# Patient Record
Sex: FEMALE | Race: WHITE | NOT HISPANIC OR LATINO | Employment: PART TIME | URBAN - METROPOLITAN AREA
[De-identification: names, ages, dates, MRNs, and addresses within clinical notes are randomized per-mention and may not be internally consistent; named-entity substitution may affect disease eponyms.]

---

## 2017-01-30 ENCOUNTER — GENERIC CONVERSION - ENCOUNTER (OUTPATIENT)
Dept: OTHER | Facility: OTHER | Age: 47
End: 2017-01-30

## 2020-10-05 ENCOUNTER — TELEMEDICINE (OUTPATIENT)
Dept: GASTROENTEROLOGY | Facility: CLINIC | Age: 50
End: 2020-10-05

## 2020-10-05 VITALS — BODY MASS INDEX: 20.38 KG/M2 | HEIGHT: 63 IN | WEIGHT: 115 LBS

## 2020-10-05 DIAGNOSIS — Z12.11 SCREENING FOR COLON CANCER: Primary | ICD-10-CM

## 2020-10-05 RX ORDER — LEVOTHYROXINE SODIUM 88 UG/1
1 TABLET ORAL DAILY
COMMUNITY
Start: 2012-11-01

## 2020-10-05 RX ORDER — ESTRADIOL 0.5 MG/1
0.5 TABLET ORAL DAILY
COMMUNITY

## 2020-10-05 RX ORDER — SODIUM, POTASSIUM,MAG SULFATES 17.5-3.13G
SOLUTION, RECONSTITUTED, ORAL ORAL
Qty: 2 BOTTLE | Refills: 0 | Status: SHIPPED | OUTPATIENT
Start: 2020-10-05 | End: 2020-10-12 | Stop reason: HOSPADM

## 2020-10-12 ENCOUNTER — ANESTHESIA (OUTPATIENT)
Dept: GASTROENTEROLOGY | Facility: AMBULATORY SURGERY CENTER | Age: 50
End: 2020-10-12

## 2020-10-12 ENCOUNTER — HOSPITAL ENCOUNTER (OUTPATIENT)
Dept: GASTROENTEROLOGY | Facility: AMBULATORY SURGERY CENTER | Age: 50
Discharge: HOME | End: 2020-10-12
Payer: COMMERCIAL

## 2020-10-12 ENCOUNTER — ANESTHESIA EVENT (OUTPATIENT)
Dept: GASTROENTEROLOGY | Facility: AMBULATORY SURGERY CENTER | Age: 50
End: 2020-10-12

## 2020-10-12 VITALS
TEMPERATURE: 97.5 F | DIASTOLIC BLOOD PRESSURE: 75 MMHG | HEART RATE: 59 BPM | OXYGEN SATURATION: 100 % | RESPIRATION RATE: 15 BRPM | SYSTOLIC BLOOD PRESSURE: 118 MMHG

## 2020-10-12 VITALS — HEART RATE: 69 BPM

## 2020-10-12 DIAGNOSIS — Z12.11 SCREENING FOR COLON CANCER: ICD-10-CM

## 2020-10-12 PROBLEM — E03.9 HYPOTHYROIDISM: Status: ACTIVE | Noted: 2020-10-12

## 2020-10-12 PROCEDURE — 88305 TISSUE EXAM BY PATHOLOGIST: CPT | Performed by: PATHOLOGY

## 2020-10-12 PROCEDURE — 45385 COLONOSCOPY W/LESION REMOVAL: CPT | Performed by: INTERNAL MEDICINE

## 2020-10-12 RX ORDER — SODIUM CHLORIDE 9 MG/ML
50 INJECTION, SOLUTION INTRAVENOUS CONTINUOUS
Status: DISCONTINUED | OUTPATIENT
Start: 2020-10-12 | End: 2020-10-16 | Stop reason: HOSPADM

## 2020-10-12 RX ORDER — PROPOFOL 10 MG/ML
INJECTION, EMULSION INTRAVENOUS AS NEEDED
Status: DISCONTINUED | OUTPATIENT
Start: 2020-10-12 | End: 2020-10-12

## 2020-10-12 RX ADMIN — SODIUM CHLORIDE 50 ML/HR: 9 INJECTION, SOLUTION INTRAVENOUS at 09:29

## 2020-10-12 RX ADMIN — PROPOFOL 50 MG: 10 INJECTION, EMULSION INTRAVENOUS at 09:54

## 2020-10-12 RX ADMIN — PROPOFOL 100 MG: 10 INJECTION, EMULSION INTRAVENOUS at 09:44

## 2020-10-12 RX ADMIN — PROPOFOL 100 MG: 10 INJECTION, EMULSION INTRAVENOUS at 09:39

## 2020-10-12 RX ADMIN — PROPOFOL 50 MG: 10 INJECTION, EMULSION INTRAVENOUS at 09:57

## 2022-10-19 ENCOUNTER — OFFICE VISIT (OUTPATIENT)
Dept: ENDOCRINOLOGY | Facility: HOSPITAL | Age: 52
End: 2022-10-19
Payer: COMMERCIAL

## 2022-10-19 VITALS
SYSTOLIC BLOOD PRESSURE: 122 MMHG | HEART RATE: 70 BPM | WEIGHT: 126 LBS | DIASTOLIC BLOOD PRESSURE: 70 MMHG | HEIGHT: 63 IN | BODY MASS INDEX: 22.32 KG/M2

## 2022-10-19 DIAGNOSIS — M81.6 LOCALIZED OSTEOPOROSIS WITHOUT CURRENT PATHOLOGICAL FRACTURE: Primary | ICD-10-CM

## 2022-10-19 DIAGNOSIS — E28.39 PREMATURE OVARIAN FAILURE: ICD-10-CM

## 2022-10-19 DIAGNOSIS — M85.852 OSTEOPENIA OF LEFT HIP: ICD-10-CM

## 2022-10-19 DIAGNOSIS — M85.88 OSTEOPENIA OF SPINE: ICD-10-CM

## 2022-10-19 DIAGNOSIS — E03.9 ACQUIRED HYPOTHYROIDISM: ICD-10-CM

## 2022-10-19 PROCEDURE — 99204 OFFICE O/P NEW MOD 45 MIN: CPT | Performed by: INTERNAL MEDICINE

## 2022-10-19 RX ORDER — LEVOTHYROXINE SODIUM 0.07 MG/1
75 TABLET ORAL
COMMUNITY
Start: 2021-07-01

## 2022-10-19 RX ORDER — MELATONIN
1000 DAILY
COMMUNITY
End: 2022-10-19 | Stop reason: ALTCHOICE

## 2022-10-19 RX ORDER — IMIQUIMOD 12.5 MG/.25G
CREAM TOPICAL
COMMUNITY
Start: 2022-10-12

## 2022-10-19 RX ORDER — IBUPROFEN 200 MG
1250 CAPSULE ORAL DAILY
COMMUNITY
End: 2022-10-19 | Stop reason: ALTCHOICE

## 2022-10-19 RX ORDER — ESTRADIOL 0.1 MG/G
CREAM VAGINAL
COMMUNITY
Start: 2022-10-01

## 2022-10-19 NOTE — PATIENT INSTRUCTIONS
Let's do blood work and a urine test     Continue the same calcium for now but move to 3-4 hours away from the thyroid medicine  Continue the exercising  The diet is good  We could treat with first line treatment for osteoporosis, ibandronate(boniva) once a month or alendronate(fosamax) once a week  Follow up to be determined

## 2022-10-19 NOTE — PROGRESS NOTES
10/19/2022    Assessment/Plan      Diagnoses and all orders for this visit:    Localized osteoporosis without current pathological fracture  -     Comprehensive metabolic panel Lab Collect  -     Phosphorus Lab Collect  -     Magnesium Lab Collect  -     PTH, intact Lab Collect Lab Collect  -     Protein electrophoresis, serum Lab Collect  -     Vitamin D 25 hydroxy Lab Collect  -     TSH, 3rd generation Lab Collect  -     T4, free Lab Collect  -     Thyroid Peroxidase and Thyroglobulin Antibodies  -     N-Telopeptide Cross-links (NTx), Urine (Serial Monitor)    Acquired hypothyroidism  -     Comprehensive metabolic panel Lab Collect  -     Phosphorus Lab Collect  -     Magnesium Lab Collect  -     PTH, intact Lab Collect Lab Collect  -     Protein electrophoresis, serum Lab Collect  -     Vitamin D 25 hydroxy Lab Collect  -     TSH, 3rd generation Lab Collect  -     T4, free Lab Collect  -     Thyroid Peroxidase and Thyroglobulin Antibodies  -     N-Telopeptide Cross-links (NTx), Urine (Serial Monitor)    Osteopenia of left hip  -     Comprehensive metabolic panel Lab Collect  -     Phosphorus Lab Collect  -     Magnesium Lab Collect  -     PTH, intact Lab Collect Lab Collect  -     Protein electrophoresis, serum Lab Collect  -     Vitamin D 25 hydroxy Lab Collect  -     TSH, 3rd generation Lab Collect  -     T4, free Lab Collect  -     Thyroid Peroxidase and Thyroglobulin Antibodies  -     N-Telopeptide Cross-links (NTx), Urine (Serial Monitor)    Osteopenia of spine  -     Comprehensive metabolic panel Lab Collect  -     Phosphorus Lab Collect  -     Magnesium Lab Collect  -     PTH, intact Lab Collect Lab Collect  -     Protein electrophoresis, serum Lab Collect  -     Vitamin D 25 hydroxy Lab Collect  -     TSH, 3rd generation Lab Collect  -     T4, free Lab Collect  -     Thyroid Peroxidase and Thyroglobulin Antibodies  -     N-Telopeptide Cross-links (NTx), Urine (Serial Monitor)    Premature ovarian failure  -     Comprehensive metabolic panel Lab Collect  -     Phosphorus Lab Collect  -     Magnesium Lab Collect  -     PTH, intact Lab Collect Lab Collect  -     Protein electrophoresis, serum Lab Collect  -     Vitamin D 25 hydroxy Lab Collect  -     TSH, 3rd generation Lab Collect  -     T4, free Lab Collect  -     Thyroid Peroxidase and Thyroglobulin Antibodies  -     N-Telopeptide Cross-links (NTx), Urine (Serial Monitor)    Other orders  -     levothyroxine 75 mcg tablet; Take 75 mcg by mouth daily in the early morning  -     imiquimod (ALDARA) 5 % cream; apply to warts nightly under a bandage  wash off with soap and water in the morning  -     estradiol (ESTRACE) 0 1 mg/g vaginal cream; Insert into the vagina Utilizes less than 1 gram 2 times a week  -     Discontinue: calcium carbonate (OS-JENARO) 1250 (500 Ca) MG tablet; Take 1,250 mg by mouth daily  -     Discontinue: cholecalciferol (VITAMIN D3) 1,000 units tablet; Take 1,000 Units by mouth daily  -     CALCIUM CITRATE-VITAMIN D3 PO; Take by mouth 2 tablets equals 650 mg calcium and 25 mcg(1000 units) vitamin D, takes 4 tablets daily  Assessment/Plan:  1  Hypothyroidism  I have no recent blood work so I will repeat her TSH and free T4  I will do thyroid antibodies to see if she has an underlying Hashimoto's thyroiditis  For now, she will continue the same levothyroxine 75 mcg daily  She will move her calcium tablets 3-4 hours away from when she takes her thyroid hormone to improve thyroid hormone absorption  2  Osteoporosis with osteopenia  At this point, for completeness, I will rule out secondary causes of osteoporosis and osteopenia but most likely she has never achieved peak bone mass given she went through menopause so early  She then lost bone mass post menopause and post estrogen replacement as the most bone loss occurs within the 1st 5 years after menopause    At this point, we discussed moving her calcium tablets to 3-4 hours away from her thyroid medicine to prevent any interference with thyroid hormone absorption  She will continue weight-bearing exercises  She is eating plenty of calcium containing foods  Most likely if we do need to treat her osteoporosis after ruling out secondary causes, first-line treatment is a bisphosphonate and she could utilize alendronate or ibandronate which we discussed in the office  Further treatments would include Prolia, Forteo, or evenity  We did discuss these options in the office today  3  Premature ovarian failure  It is most likely due to her premature ovarian failure at such an early age that she never achieved peak bone mass which likely impacted on her bone mineral density  She will get blood work done at her earliest convenience consisting of a CMP, magnesium, phosphorus, intact parathyroid hormone level, serum protein electrophoresis, 25 hydroxy vitamin-D level, TSH, free T4, and thyroid antibodies  I will also do a urine N telopeptide crosslinks to check for hyper bone turnover  Follow-up will be determined based on the blood work  CC:  Osteoporosis, thyroid consult    History of Present Illness     HPI: Severa Muff is a 46y o  year old female with history of osteoporosis/osteopenia and hypothyroidism for evaluation/consult  She was diagnosed with osteopenia in 2016 at the age of 55 when she would a DEXA scan demonstrating a T-score of-2 of the spine and a femoral neck score of -1 5  This was a 5% decrease from 2010  She was utilizing just calcium and vitamin-D replacement along with weight-bearing exercises as she had seen [facility] endocrinology at that time  This year, she had another DEXA scan demonstrating a 9% worsening in the spine and 2% worsening in the femoral neck from 2019 and is here for evaluation as to whether she needs any further treatment for her osteoporosis    Of note, she did undergo premature ovarian failure at the age of 28 and was on estrogen replacement for about 20 years but was weaned off in January 2021  She has no history of renal stone or height loss  She has had no fragility fractures  She has not taken any medicines such as PPIs or seizure medications or utilized steroids  There is family history of osteoporosis  Calcium containing foods include milk 5 days a week, ice cream once a week or less, cheese and yogurt daily, dark green vegetables at least 5 times a week, and does not eat sardines  Calcium citrate 650 mg/25 mcg taken twice a day  She does exercise by walking 2 to 3 times a week at least half an hour more and doing you to weight-bearing exercises which are low impact 2 to 3 times a week  She has no polyuria or polydipsia  She has once a night nocturia  She is no extremity paresthesias  She has no fatigue  She denies heartburn or indigestion or constipation  Mentally she is not confused  She denies back pain or joint pains other than some hip pain at times  She does have a history of hypothyroidism diagnosed in 2002  She is on thyroid hormone for replacement purposes and takes levothyroxine 75 mcg daily which was decreased from 88 mcg daily 2 years ago  There is no family history of thyroid disease or autoimmune diseases  She denies heat or cold intolerance, palpitation, tremors, fatigue, or weight changes  She denies diarrhea or constipation, anxiety or depression, or insomnia  She has no dry skin, brittle nails, or hair loss  Review of Systems   Constitutional: Negative for fatigue and unexpected weight change  HENT: Negative for hearing loss, tinnitus and trouble swallowing  No XRT to the head or neck in the past   Eyes: Negative for visual disturbance  Wears glasses  No diplopia  Respiratory: Negative for chest tightness and shortness of breath  Cardiovascular: Negative for chest pain, palpitations and leg swelling     Gastrointestinal: Negative for abdominal pain, constipation, diarrhea and nausea  No heartburn  Endocrine: Negative for cold intolerance, heat intolerance, polydipsia and polyuria  Nocturia once a night  Musculoskeletal: Negative for arthralgias and back pain  Some hip pain if long walking  Skin: Negative for rash  No dry skin  No brittle nails  No hair loss  Neurological: Negative for dizziness, tremors, weakness, light-headedness, numbness and headaches  No perioral paresthesias  Gets leg cramps posterior calf or in the toes/bottom of the feet  Calves overnight  And feet if flexing feet when bending down to reach  No muscle twitches  Psychiatric/Behavioral: Negative for confusion, dysphoric mood and sleep disturbance  The patient is not nervous/anxious   reports more snoring          Historical Information   Past Medical History:   Diagnosis Date   • Disease of thyroid gland    • Premature ovarian failure 2002    age 28     Past Surgical History:   Procedure Laterality Date   • BREAST SURGERY Left     breast biopsy     Social History   Social History     Substance and Sexual Activity   Alcohol Use Yes   • Alcohol/week: 2 0 standard drinks   • Types: 2 Glasses of wine per week    Comment: social     Social History     Substance and Sexual Activity   Drug Use Never     Social History     Tobacco Use   Smoking Status Never Smoker   Smokeless Tobacco Never Used     Family History:   Family History   Problem Relation Age of Onset   • Osteoporosis Mother    • Arthritis Mother    • Heart disease Father         heart attack 2000   • Prostate cancer Father         XRT   • No Known Problems Brother    • Osteoporosis Maternal Grandmother    • No Known Problems Son    • No Known Problems Daughter    • Colon cancer Neg Hx    • Colon polyps Neg Hx        Meds/Allergies   Current Outpatient Medications   Medication Sig Dispense Refill   • CALCIUM CITRATE-VITAMIN D3 PO Take by mouth 2 tablets equals 650 mg calcium and 25 mcg(1000 units) vitamin D, takes 4 tablets daily  • estradiol (ESTRACE) 0 1 mg/g vaginal cream Insert into the vagina Utilizes less than 1 gram 2 times a week     • imiquimod (ALDARA) 5 % cream apply to warts nightly under a bandage  wash off with soap and water in the morning     • levothyroxine 75 mcg tablet Take 75 mcg by mouth daily in the early morning       No current facility-administered medications for this visit  No Known Allergies    Objective   Vitals: Blood pressure 122/70, pulse 70, height 5' 3" (1 6 m), weight 57 2 kg (126 lb)  Invasive Devices  Report    None                 Physical Exam  Vitals reviewed  Constitutional:       Appearance: Normal appearance  She is well-developed  HENT:      Head: Normocephalic and atraumatic  Comments: Negative chvostek's sign  Eyes:      Extraocular Movements: Extraocular movements intact  Conjunctiva/sclera: Conjunctivae normal       Comments: No lid lag, stare, proptosis, or periorbital edema  Neck:      Thyroid: No thyromegaly  Vascular: No carotid bruit  Comments: Thyroid normal in size  No palpable thyroid nodules  No bruits over the thyroid gland  No palpable masses  Cardiovascular:      Rate and Rhythm: Normal rate and regular rhythm  Heart sounds: Normal heart sounds  No murmur heard  Pulmonary:      Effort: Pulmonary effort is normal       Breath sounds: Normal breath sounds  No wheezing  Abdominal:      General: Bowel sounds are normal       Palpations: Abdomen is soft  Tenderness: There is no abdominal tenderness  Musculoskeletal:         General: No deformity  Normal range of motion  Cervical back: Normal range of motion and neck supple  Right lower leg: No edema  Left lower leg: No edema  Comments: No tremor of the outstretched hands  No CVA tenderness  No spinous process tenderness  Lymphadenopathy:      Cervical: No cervical adenopathy     Skin:     General: Skin is warm and dry  Findings: No rash  Neurological:      Mental Status: She is alert and oriented to person, place, and time  Deep Tendon Reflexes: Reflexes are normal and symmetric  Comments: Patellar deep tendon reflexes normal          The history was obtained from the review of the chart and from the patient  Lab Results:   Blood work done on 05/26/2022 showed a CMP with a calcium of 9 7, albumin 4 5 with a corrected calcium of 9 3 but was otherwise normal       TSH is 3 12 with a total T4 of 8 5 and a T3 uptake of 32%  FTI is 2 7  DEXA scan:    [facility] performed at [facility] on 06/17/2022 showed lumbar spine T-score of-2 4 with isolated T-scores of-2 6 in L3 and L4  T-score of the left hip is -0 9 but femoral neck is -2 0 there was a 9% decrease in bone mineral density of the spine and a 2% decrease in bone mineral density of the femoral neck since 2019  No future appointments

## 2022-10-25 LAB
25(OH)D3 SERPL-MCNC: 47 NG/ML (ref 30–100)
ALBUMIN SERPL ELPH-MCNC: 4.6 G/DL (ref 3.8–4.8)
ALBUMIN SERPL-MCNC: 4.6 G/DL (ref 3.6–5.1)
ALBUMIN/GLOB SERPL: 1.9 (CALC) (ref 1–2.5)
ALP SERPL-CCNC: 62 U/L (ref 37–153)
ALPHA1 GLOB SERPL ELPH-MCNC: 0.2 G/DL (ref 0.2–0.3)
ALPHA2 GLOB SERPL ELPH-MCNC: 0.8 G/DL (ref 0.5–0.9)
ALT SERPL-CCNC: 10 U/L (ref 6–29)
AST SERPL-CCNC: 16 U/L (ref 10–35)
BETA1 GLOB SERPL ELPH-MCNC: 0.4 G/DL (ref 0.4–0.6)
BETA2 GLOB SERPL ELPH-MCNC: 0.2 G/DL (ref 0.2–0.5)
BILIRUB SERPL-MCNC: 0.4 MG/DL (ref 0.2–1.2)
BUN SERPL-MCNC: 11 MG/DL (ref 7–25)
BUN/CREAT SERPL: NORMAL (CALC) (ref 6–22)
CALCIUM SERPL-MCNC: 9.6 MG/DL (ref 8.6–10.4)
CHLORIDE SERPL-SCNC: 102 MMOL/L (ref 98–110)
CO2 SERPL-SCNC: 29 MMOL/L (ref 20–32)
CREAT SERPL-MCNC: 0.72 MG/DL (ref 0.5–1.03)
GAMMA GLOB SERPL ELPH-MCNC: 0.9 G/DL (ref 0.8–1.7)
GFR/BSA.PRED SERPLBLD CYS-BASED-ARV: 101 ML/MIN/1.73M2
GLOBULIN SER CALC-MCNC: 2.4 G/DL (CALC) (ref 1.9–3.7)
GLUCOSE SERPL-MCNC: 94 MG/DL (ref 65–139)
MAGNESIUM SERPL-MCNC: 2.2 MG/DL (ref 1.5–2.5)
PHOSPHATE SERPL-MCNC: 3.5 MG/DL (ref 2.5–4.5)
POTASSIUM SERPL-SCNC: 4 MMOL/L (ref 3.5–5.3)
PROT PATTERN SERPL ELPH-IMP: NORMAL
PROT SERPL-MCNC: 7 G/DL (ref 6.1–8.1)
PROT SERPL-MCNC: 7 G/DL (ref 6.1–8.1)
PTH-INTACT SERPL-MCNC: 55 PG/ML (ref 16–77)
SODIUM SERPL-SCNC: 139 MMOL/L (ref 135–146)
T4 FREE SERPL-MCNC: 1.3 NG/DL (ref 0.8–1.8)
THYROGLOB AB SERPL-ACNC: 4 IU/ML
THYROPEROXIDASE AB SERPL-ACNC: 30 IU/ML
TSH SERPL-ACNC: 3.48 MIU/L

## 2022-10-26 DIAGNOSIS — M85.88 OSTEOPENIA OF SPINE: ICD-10-CM

## 2022-10-26 DIAGNOSIS — M85.852 OSTEOPENIA OF LEFT HIP: ICD-10-CM

## 2022-10-26 DIAGNOSIS — M81.6 LOCALIZED OSTEOPOROSIS WITHOUT CURRENT PATHOLOGICAL FRACTURE: Primary | ICD-10-CM

## 2022-11-05 LAB
COLLAGEN NTX/CREAT UR-SRTO: 38 NM BCE/MM CREAT
CREAT UR-MCNC: 160 MG/DL (ref 20–275)

## 2022-11-08 PROBLEM — E03.8 HYPOTHYROIDISM DUE TO HASHIMOTO'S THYROIDITIS: Status: ACTIVE | Noted: 2020-10-12

## 2022-11-08 PROBLEM — E06.3 HYPOTHYROIDISM DUE TO HASHIMOTO'S THYROIDITIS: Status: ACTIVE | Noted: 2020-10-12
